# Patient Record
Sex: FEMALE | Race: WHITE
[De-identification: names, ages, dates, MRNs, and addresses within clinical notes are randomized per-mention and may not be internally consistent; named-entity substitution may affect disease eponyms.]

---

## 2021-04-01 ENCOUNTER — HOSPITAL ENCOUNTER (OUTPATIENT)
Dept: HOSPITAL 11 - JP.SDS | Age: 51
Discharge: HOME | End: 2021-04-01
Attending: SURGERY
Payer: MEDICAID

## 2021-04-01 DIAGNOSIS — K63.5: Primary | ICD-10-CM

## 2021-04-01 DIAGNOSIS — Z20.822: ICD-10-CM

## 2021-04-01 DIAGNOSIS — Z01.812: ICD-10-CM

## 2021-04-01 PROCEDURE — 45380 COLONOSCOPY AND BIOPSY: CPT

## 2021-04-01 PROCEDURE — 88305 TISSUE EXAM BY PATHOLOGIST: CPT

## 2021-04-02 NOTE — OR
DATE OF PROCEDURE:  04/01/2021

 

SURGEON:  Luis Fernando Abdalla MD

 

PROCEDURE:  Colonoscopy.

 

FINDINGS:  Sigmoid colon polyp, approximately 5 mm, completely removed using cold biopsy

forceps.

 

COMPLICATIONS:  None.

 

ASSISTANT:  None.

 

PREOPERATIVE DIAGNOSIS:  Positive Cologuard.

 

POSTOPERATIVE DIAGNOSIS:  Positive Cologuard.

 

RISKS:  Risks, benefits, alternatives, and limitations including, but not limited to

infection, bleeding, perforation, false positives and false negatives were explained to the

patient and she wished to proceed.

 

PROCEDURE IN DETAIL:  The patient was placed in left lateral decubitus position.  Digital

rectal exam was performed without abnormality.  Scope was introduced and advanced

atraumatically to the ileocecal valve.  A photo was taken of this.  Scope was brought back

to the ascending, transverse, descending colon, and retroflexed.

 

The aforementioned polyp was identified and completely removed.

 

No diverticulosis.

 

No old or new blood.

 

No abnormalities on retroflexion.

 

The prep was acceptable, approximately 90% of the luminal surface could be seen.

 

Greater than 12 minutes was spent removing the scope.  The patient tolerated the procedure

well.

 

 

 

 

Luis Fernando Abdalla MD

DD:  04/01/2021 09:25:40

DT:  04/01/2021 15:06:55

Job #:  [id number]

## 2022-01-21 ENCOUNTER — HOSPITAL ENCOUNTER (EMERGENCY)
Dept: HOSPITAL 11 - JP.ED | Age: 52
Discharge: HOME | End: 2022-01-21
Payer: MEDICAID

## 2022-01-21 DIAGNOSIS — U07.1: Primary | ICD-10-CM

## 2023-05-07 ENCOUNTER — HOSPITAL ENCOUNTER (EMERGENCY)
Dept: HOSPITAL 11 - JP.ED | Age: 53
Discharge: HOME | End: 2023-05-07
Payer: MEDICAID

## 2023-05-07 DIAGNOSIS — Z86.16: ICD-10-CM

## 2023-05-07 DIAGNOSIS — Z20.822: ICD-10-CM

## 2023-05-07 DIAGNOSIS — J02.9: Primary | ICD-10-CM

## 2023-05-07 PROCEDURE — U0002 COVID-19 LAB TEST NON-CDC: HCPCS
